# Patient Record
Sex: FEMALE | URBAN - METROPOLITAN AREA
[De-identification: names, ages, dates, MRNs, and addresses within clinical notes are randomized per-mention and may not be internally consistent; named-entity substitution may affect disease eponyms.]

---

## 2021-06-03 ENCOUNTER — NURSE TRIAGE (OUTPATIENT)
Dept: OTHER | Facility: OTHER | Age: 76
End: 2021-06-03

## 2021-06-04 NOTE — TELEPHONE ENCOUNTER
Patient calling and states that she wants to speak with a doctor who is on call for Dr Romero's practice  She states "I took the wrong dose of insulin and I don't know what to do"  She stated "This is very hard to explain and I would rather talk to a doctor "     Message sent to doctor Selene Leyden Dr Selene Leyden states she will call patient now    Reason for Disposition   [1] Caller has URGENT medication question about med that PCP or specialist prescribed AND [2] triager unable to answer question    Answer Assessment - Initial Assessment Questions  1  NAME of MEDICATION: "What medicine are you calling about?"      Insulin  2  QUESTION: "What is your question?"      I took the wrong amount of insulin and I want to talk to a doctor  3  PRESCRIBING HCP: "Who prescribed it?" Reason: if prescribed by specialist, call should be referred to that group  Dr Romero  4  SYMPTOMS: "Do you have any symptoms?"      Unsure  5  SEVERITY: If symptoms are present, ask "Are they mild, moderate or severe?"      Not states  6    PREGNANCY:  "Is there any chance that you are pregnant?" "When was your last menstrual period?"      No    Protocols used: MEDICATION QUESTION CALL-ADULT-